# Patient Record
Sex: MALE | URBAN - METROPOLITAN AREA
[De-identification: names, ages, dates, MRNs, and addresses within clinical notes are randomized per-mention and may not be internally consistent; named-entity substitution may affect disease eponyms.]

---

## 2020-01-07 ENCOUNTER — OFFICE VISIT (OUTPATIENT)
Dept: FAMILY MEDICINE | Facility: CLINIC | Age: 35
End: 2020-01-07
Payer: MEDICAID

## 2020-01-07 VITALS
SYSTOLIC BLOOD PRESSURE: 131 MMHG | TEMPERATURE: 97.7 F | BODY MASS INDEX: 30.46 KG/M2 | HEIGHT: 68 IN | WEIGHT: 201 LBS | HEART RATE: 57 BPM | OXYGEN SATURATION: 95 % | DIASTOLIC BLOOD PRESSURE: 77 MMHG

## 2020-01-07 DIAGNOSIS — F17.210 CIGARETTE NICOTINE DEPENDENCE WITHOUT COMPLICATION: ICD-10-CM

## 2020-01-07 DIAGNOSIS — Z76.89 ENCOUNTER TO ESTABLISH CARE: Primary | ICD-10-CM

## 2020-01-07 DIAGNOSIS — F19.90 SUBSTANCE USE DISORDER: ICD-10-CM

## 2020-01-07 ASSESSMENT — MIFFLIN-ST. JEOR: SCORE: 1821.23

## 2020-01-07 NOTE — PROGRESS NOTES
"       HPI       Liam Noble is a 34 year old  who presents for   Chief Complaint   Patient presents with     [facility]       Does not see the reason for this visit. [facility] treatment facility wanted him to come here, does not know why. Has no concerns, does not want to talk about things.   Prior care: [facility], does not want to go to [facility].     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   Using x15 years  Currently in treatment at [facility]: 3 weeks in, does not know how long will be there  Sober 4 months  Alcohol use: sober 4 months.   Cigarettes: trying to quit as we speak. Wants to do it cold turkey.     HIV negative since last check when entered into care home in 2016, no IVDU. Thinks has had HIV and hep C testing, does not want more testing today    Has been in treatment maybe 10 times. Longest sober periods 20 months (care home to outside).     Sexual history: abstinence 4 months. Does not desire STI testing today.       +++++++      Problem, Medication and Allergy Lists were new pt, updated.    Patient is a new patient to this clinic and so  I reviewed/updated the Past Medical History, the Family History and the Social History .         Review of Systems:   Review of Systems  7 system ROS negative other than as noted in HPI         Physical Exam:     Vitals:    01/07/20 1303   BP: 131/77   BP Location: Left arm   Patient Position: Sitting   Cuff Size: Adult Large   Pulse: 57   Temp: 97.7  F (36.5  C)   TempSrc: Oral   SpO2: 95%   Weight: 91.2 kg (201 lb)   Height: 1.727 m (5' 8\")     Body mass index is 30.56 kg/m .  Vitals were reviewed and were normal     Physical Exam  Vitals signs and nursing note reviewed.   Constitutional:       General: He is not in acute distress.     Appearance: He is well-developed. He is diaphoretic (slightly).   HENT:      Head: Normocephalic and atraumatic.   Neck:      Musculoskeletal: Normal range of motion. "   Cardiovascular:      Rate and Rhythm: Normal rate and regular rhythm.      Heart sounds: No murmur.   Pulmonary:      Effort: Pulmonary effort is normal. No respiratory distress.      Breath sounds: Normal breath sounds. No wheezing.   Musculoskeletal: Normal range of motion.   Skin:     General: Skin is warm.   Neurological:      Mental Status: He is alert and oriented to person, place, and time.   Psychiatric:         Attention and Perception: Attention normal.         Speech: Speech is rapid and pressured (slightly).      Comments: Slightly agitated mood, does not know why he has to have this appt.            Results:   Recommended HIV, Hep C and STI testing, patient declined    Assessment and Plan        1. Encounter to establish care  Encounter to establish care, although patient does not desire to have a pcp.   Updated history  No YOAV signed as patient states hasnt had a pcp of significant hospital/ED visits.    2. Substance use disorder  4 months sober, from treatment to [facility], where has currently been for 3 months. DOC: meth. Denies IVDU. Has had assisted stays. Declined HIV/ Hep C/ STI testing today.    3. Cigarette nicotine dependence without complication  Desires to quit, wants to do cold turkey         There are no discontinued medications.    Options for treatment and follow-up care were reviewed with the patient. Liam Noble  engaged in the decision making process and verbalized understanding of the options discussed and agreed with the final plan.    Breanne Dumont MD

## 2020-01-07 NOTE — PROGRESS NOTES
Preceptor Attestation:   Patient seen, evaluated and discussed with the resident. I have verified the content of the note, which accurately reflects my assessment of the patient and the plan of care.   Supervising Physician:  Lindsey Barker MD